# Patient Record
Sex: FEMALE | Race: BLACK OR AFRICAN AMERICAN | NOT HISPANIC OR LATINO | Employment: FULL TIME | URBAN - METROPOLITAN AREA
[De-identification: names, ages, dates, MRNs, and addresses within clinical notes are randomized per-mention and may not be internally consistent; named-entity substitution may affect disease eponyms.]

---

## 2017-05-10 ENCOUNTER — HOSPITAL ENCOUNTER (EMERGENCY)
Facility: HOSPITAL | Age: 42
Discharge: HOME | End: 2017-05-11
Attending: FAMILY MEDICINE
Payer: COMMERCIAL

## 2017-05-10 VITALS
HEIGHT: 64 IN | BODY MASS INDEX: 28.85 KG/M2 | HEART RATE: 86 BPM | WEIGHT: 169 LBS | OXYGEN SATURATION: 95 % | SYSTOLIC BLOOD PRESSURE: 138 MMHG | TEMPERATURE: 98 F | RESPIRATION RATE: 18 BRPM | DIASTOLIC BLOOD PRESSURE: 92 MMHG

## 2017-05-10 DIAGNOSIS — S49.92XA SHOULDER INJURY, LEFT, INITIAL ENCOUNTER: ICD-10-CM
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
## 2017-05-10 PROCEDURE — 99283 EMERGENCY DEPT VISIT LOW MDM: CPT | Mod: 25

## 2017-05-10 PROCEDURE — 99284 EMERGENCY DEPT VISIT MOD MDM: CPT | Mod: ,,, | Performed by: FAMILY MEDICINE

## 2017-05-10 PROCEDURE — 63600175 PHARM REV CODE 636 W HCPCS: Performed by: FAMILY MEDICINE

## 2017-05-10 PROCEDURE — 96372 THER/PROPH/DIAG INJ SC/IM: CPT

## 2017-05-10 RX ORDER — KETOROLAC TROMETHAMINE 30 MG/ML
30 INJECTION, SOLUTION INTRAMUSCULAR; INTRAVENOUS
Status: COMPLETED | OUTPATIENT
Start: 2017-05-10 | End: 2017-05-10

## 2017-05-10 RX ADMIN — KETOROLAC TROMETHAMINE 30 MG: 30 INJECTION, SOLUTION INTRAMUSCULAR at 10:05
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
## 2017-05-11 RX ORDER — HYDROCODONE BITARTRATE AND ACETAMINOPHEN 5; 325 MG/1; MG/1
1-2 TABLET ORAL EVERY 6 HOURS PRN
Qty: 18 TABLET | Refills: 0 | Status: SHIPPED | OUTPATIENT
Start: 2017-05-11
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
## 2017-05-11 NOTE — ED NOTES
Two patient identifiers checked and confirmed.    APPEARANCE: Resting comfortably in no acute distress. Patient has clean hair, skin and nails. Clothing is appropriate and properly fastened.  NEURO: Awake, alert, appropriate for age, and cooperative with a calm affect; pupils equal and round.  HEENT: Head symmetrical. Bilateral eyes without redness or drainage.  CARDIAC: Regular rate and rhythm.  RESPIRATORY: Airway is open and patent. Respirations are spontaneous on room air. Normal respiratory effort and rate noted.  GI/: Abdomen soft and non-distended. Patient is reported to void and stool appropriately for age.  NEUROVASCULAR: All extremities are warm and pink with +2 pulses and capillary refill less than 3 seconds.  MUSCULOSKELETAL: Moves all extremities well; no obvious deformities noted. Left upper extremity weakness noted at this time.   SKIN: Warm and dry, adequate turgor, mucus membranes moist and pink
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
## 2017-05-11 NOTE — PROVIDER PROGRESS NOTES - EMERGENCY DEPT.
Encounter Date: 5/10/2017    ED Physician Progress Notes        Physician Note:    Reviewed data from the [place], [place] and [facility] controlled drug databases.  Regarding prescriptions.  Daily, oxycodone therapy from 2012 2 January 2017.  Single provider.  30 mg oxycodone per day on average.  Single   Hydrocodone prescription March 2017.

## 2018-10-08 ENCOUNTER — NON-PROVIDER VISIT (OUTPATIENT)
Dept: OCCUPATIONAL MEDICINE | Facility: CLINIC | Age: 43
End: 2018-10-08

## 2018-10-08 DIAGNOSIS — Z02.1 PRE-EMPLOYMENT DRUG SCREENING: ICD-10-CM

## 2018-10-08 DIAGNOSIS — Z02.89 VISIT FOR OCCUPATIONAL HEALTH EXAMINATION: ICD-10-CM

## 2018-10-08 LAB
AMP AMPHETAMINE: NORMAL
BAR BARBITURATES: NORMAL
BZO BENZODIAZEPINES: NORMAL
COC COCAINE: NORMAL
INT CON NEG: NORMAL
INT CON POS: NORMAL
MDMA ECSTASY: NORMAL
MET METHAMPHETAMINES: NORMAL
MTD METHADONE: NORMAL
OPI OPIATES: NORMAL
OXY OXYCODONE: NORMAL
PCP PHENCYCLIDINE: NORMAL
POC URINE DRUG SCREEN OCDRS: NORMAL
THC: NORMAL

## 2018-10-08 PROCEDURE — 94375 RESPIRATORY FLOW VOLUME LOOP: CPT | Performed by: PREVENTIVE MEDICINE

## 2018-10-08 PROCEDURE — 80305 DRUG TEST PRSMV DIR OPT OBS: CPT | Performed by: PREVENTIVE MEDICINE
